# Patient Record
Sex: MALE | Race: BLACK OR AFRICAN AMERICAN | Employment: FULL TIME | URBAN - METROPOLITAN AREA
[De-identification: names, ages, dates, MRNs, and addresses within clinical notes are randomized per-mention and may not be internally consistent; named-entity substitution may affect disease eponyms.]

---

## 2024-07-02 ENCOUNTER — APPOINTMENT (OUTPATIENT)
Dept: GENERAL RADIOLOGY | Age: 24
End: 2024-07-02
Attending: NURSE PRACTITIONER
Payer: MEDICAID

## 2024-07-02 ENCOUNTER — HOSPITAL ENCOUNTER (EMERGENCY)
Age: 24
Discharge: HOME | End: 2024-07-02
Attending: EMERGENCY MEDICINE
Payer: MEDICAID

## 2024-07-02 VITALS
WEIGHT: 236 LBS | DIASTOLIC BLOOD PRESSURE: 79 MMHG | TEMPERATURE: 98 F | SYSTOLIC BLOOD PRESSURE: 115 MMHG | RESPIRATION RATE: 18 BRPM | BODY MASS INDEX: 33.79 KG/M2 | HEIGHT: 70 IN | HEART RATE: 69 BPM | OXYGEN SATURATION: 97 %

## 2024-07-02 DIAGNOSIS — M70.21 OLECRANON BURSITIS OF RIGHT ELBOW: Primary | ICD-10-CM

## 2024-07-02 PROCEDURE — 99284 EMERGENCY DEPT VISIT MOD MDM: CPT

## 2024-07-02 PROCEDURE — 73080 X-RAY EXAM OF ELBOW: CPT | Performed by: NURSE PRACTITIONER

## 2024-07-02 PROCEDURE — 99283 EMERGENCY DEPT VISIT LOW MDM: CPT

## 2024-07-02 RX ORDER — METHYLPREDNISOLONE 4 MG/1
TABLET ORAL
Qty: 1 EACH | Refills: 0 | Status: SHIPPED | OUTPATIENT
Start: 2024-07-02

## 2024-07-02 NOTE — ED PROVIDER NOTES
Patient Seen in: [facility] In [place]      History     Chief Complaint   Patient presents with    Arm or Hand Injury     Stated Complaint: right arm pain; decrease in movement today    Subjective:   HPI    24-year-old male presents today with complaints of right elbow pain that is worse today.  Patient states a few days ago he was working out doing push-ups without stretching or warming up his muscles.  Patient states he started to develop right elbow pain.  Patient denies any sensation of popping or cracking.  Patient states that it hurts to move his elbow today.  Patient denies taking anything for the pain.    Objective:   History reviewed. No pertinent past medical history.           No pertinent past surgical history.              No pertinent social history.            Review of Systems   Constitutional: Negative.    HENT: Negative.     Eyes: Negative.    Respiratory: Negative.     Cardiovascular: Negative.    Gastrointestinal: Negative.    Endocrine: Negative.    Genitourinary: Negative.    Musculoskeletal:  Positive for arthralgias.   Allergic/Immunologic: Negative.    Neurological: Negative.    Hematological: Negative.    Psychiatric/Behavioral: Negative.         Positive for stated Chief Complaint: Arm or Hand Injury    Other systems are as noted in HPI.  Constitutional and vital signs reviewed.      All other systems reviewed and negative except as noted above.    Physical Exam     ED Triage Vitals [07/02/24 1134]   /79   Pulse 69   Resp 18   Temp 98.2 °F (36.8 °C)   Temp src    SpO2 97 %   O2 Device None (Room air)       Current Vitals:   Vital Signs  BP: 115/79  Pulse: 69  Resp: 18  Temp: 98.2 °F (36.8 °C)    Oxygen Therapy  SpO2: 97 %  O2 Device: None (Room air)            Physical Exam  Vitals and nursing note reviewed.   Constitutional:       Appearance: Normal appearance. He is normal weight.   HENT:      Head: Normocephalic.      Right Ear: External ear normal.      Left  Ear: External ear normal.   Eyes:      Extraocular Movements: Extraocular movements intact.      Conjunctiva/sclera: Conjunctivae normal.      Pupils: Pupils are equal, round, and reactive to light.   Cardiovascular:      Rate and Rhythm: Normal rate and regular rhythm.      Pulses: Normal pulses.      Heart sounds: Normal heart sounds.   Pulmonary:      Effort: Pulmonary effort is normal.      Breath sounds: Normal breath sounds.   Musculoskeletal:         General: Tenderness present.      Cervical back: Normal range of motion and neck supple.      Comments: Patient is able to flex and extend his right elbow.  More pain present with extension of right elbow.  Negative Justin deformity.   Skin:     General: Skin is warm.      Capillary Refill: Capillary refill takes less than 2 seconds.   Neurological:      General: No focal deficit present.      Mental Status: He is alert.   Psychiatric:         Mood and Affect: Mood normal.             ED Course   Labs Reviewed - No data to display                   MDM      24-year-old male presents today with complaints of right elbow pain that is worse today.  Patient states a few days ago he was working out doing push-ups without stretching or warming up his muscles.  Patient states he started to develop right elbow pain.  Patient denies any sensation of popping or cracking.  Patient states that it hurts to move his elbow today.  Patient denies taking anything for the pain.  Vital signs: Please see EMR.  Physical exam: Please see exam.  Differential diagnosis: Avulsion fracture, tendinitis, tendinosis.  XR ELBOW, COMPLETE (MIN 3 VIEWS), RIGHT (CPT=73080)    Result Date: 7/2/2024  CONCLUSION:  Posterior olecranon region soft tissue swelling. No acute fracture or other acute process.   LOCATION:  [facility]    Dictated by (CST): Julio Muller MD on 7/02/2024 at 12:35 PM     Finalized by (CST): Julio Muller MD on 7/02/2024 at 12:35 PM      Will diagnose with olecranon bursitis.   Will prescribe a steroid pack.  Patient is to follow-up with primary care provider within 1 week as needed.  Note to Patient  The 21st Century Cures Act makes medical notes like these available to patients in the interest of transparency. However, be advised this is a medical document and is intended as [id number]-[id number] communication; it is written in medical language and may appear blunt, direct, or contain abbreviations or verbiage that are unfamiliar. Medical documents are intended to carry relevant information, facts as evident, and the clinical opinion of the practitioner.     This report has been produced using speech recognition software, and may contain errors related to grammar, punctuation, spelling, words or phrases unrecognized or not translated appropriately to text; these errors may be referred to the dictating provider for further clarification and/or addendum as needed.                                     Medical Decision Making  24-year-old male presents today with complaints of right elbow pain that is worse today.  Patient states a few days ago he was working out doing push-ups without stretching or warming up his muscles.  Patient states he started to develop right elbow pain.  Patient denies any sensation of popping or cracking.  Patient states that it hurts to move his elbow today.  Patient denies taking anything for the pain.      Problems Addressed:  Olecranon bursitis of right elbow: acute illness or injury    Amount and/or Complexity of Data Reviewed  Radiology: ordered. Decision-making details documented in ED Course.    Risk  Prescription drug management.        Disposition and Plan     Clinical Impression:  1. Olecranon bursitis of right elbow         Disposition:  Discharge  7/2/2024 12:37 pm    Follow-up:  Sharan Bates MD  [address] [place] [id number]  [phone]    Follow up in 1 week(s)  As needed          Medications Prescribed:  Current Discharge  Medication List        START taking these medications    Details   methylPREDNISolone (MEDROL) 4 MG Oral Tablet Therapy Pack Dosepack: take as directed  Qty: 1 each, Refills: 0